# Patient Record
Sex: MALE | URBAN - METROPOLITAN AREA
[De-identification: names, ages, dates, MRNs, and addresses within clinical notes are randomized per-mention and may not be internally consistent; named-entity substitution may affect disease eponyms.]

---

## 2024-01-24 ENCOUNTER — HOSPITAL ENCOUNTER (OUTPATIENT)
Facility: HOSPITAL | Age: 39
Discharge: HOME | DRG: 690 | End: 2024-01-25
Attending: INTERNAL MEDICINE | Admitting: INTERNAL MEDICINE
Payer: MEDICARE

## 2024-01-24 ENCOUNTER — APPOINTMENT (OUTPATIENT)
Dept: RADIOLOGY | Facility: HOSPITAL | Age: 39
DRG: 690 | End: 2024-01-24
Payer: MEDICARE

## 2024-01-24 DIAGNOSIS — N20.0 KIDNEY STONE: Primary | ICD-10-CM

## 2024-01-24 DIAGNOSIS — N39.0 COMPLICATED UTI (URINARY TRACT INFECTION): ICD-10-CM

## 2024-01-24 LAB
ANION GAP SERPL CALC-SCNC: 17 MMOL/L (ref 10–20)
BUN SERPL-MCNC: 14 MG/DL (ref 6–23)
CALCIUM SERPL-MCNC: 7.7 MG/DL (ref 8.6–10.3)
CHLORIDE SERPL-SCNC: 101 MMOL/L (ref 98–107)
CO2 SERPL-SCNC: 22 MMOL/L (ref 21–32)
CREAT SERPL-MCNC: 1.06 MG/DL (ref 0.5–1.3)
EGFRCR SERPLBLD CKD-EPI 2021: >90 ML/MIN/1.73M*2
ERYTHROCYTE [DISTWIDTH] IN BLOOD BY AUTOMATED COUNT: 14.6 % (ref 11.5–14.5)
GLUCOSE SERPL-MCNC: 88 MG/DL (ref 74–99)
HCT VFR BLD AUTO: 37.6 % (ref 41–52)
HGB BLD-MCNC: 12.3 G/DL (ref 13.5–17.5)
MCH RBC QN AUTO: 29.3 PG (ref 26–34)
MCHC RBC AUTO-ENTMCNC: 32.7 G/DL (ref 32–36)
MCV RBC AUTO: 90 FL (ref 80–100)
NRBC BLD-RTO: 0 /100 WBCS (ref 0–0)
PLATELET # BLD AUTO: 884 X10*3/UL (ref 150–450)
POTASSIUM SERPL-SCNC: 4.9 MMOL/L (ref 3.5–5.3)
RBC # BLD AUTO: 4.2 X10*6/UL (ref 4.5–5.9)
SODIUM SERPL-SCNC: 135 MMOL/L (ref 136–145)
WBC # BLD AUTO: 18.6 X10*3/UL (ref 4.4–11.3)

## 2024-01-24 PROCEDURE — 36415 COLL VENOUS BLD VENIPUNCTURE: CPT | Performed by: INTERNAL MEDICINE

## 2024-01-24 PROCEDURE — 99152 MOD SED SAME PHYS/QHP 5/>YRS: CPT | Performed by: RADIOLOGY

## 2024-01-24 PROCEDURE — C1894 INTRO/SHEATH, NON-LASER: HCPCS

## 2024-01-24 PROCEDURE — C1729 CATH, DRAINAGE: HCPCS

## 2024-01-24 PROCEDURE — 99152 MOD SED SAME PHYS/QHP 5/>YRS: CPT

## 2024-01-24 PROCEDURE — 2720000007 HC OR 272 NO HCPCS

## 2024-01-24 PROCEDURE — 2500000002 HC RX 250 W HCPCS SELF ADMINISTERED DRUGS (ALT 637 FOR MEDICARE OP, ALT 636 FOR OP/ED): Mod: MUE | Performed by: INTERNAL MEDICINE

## 2024-01-24 PROCEDURE — 2500000004 HC RX 250 GENERAL PHARMACY W/ HCPCS (ALT 636 FOR OP/ED): Performed by: INTERNAL MEDICINE

## 2024-01-24 PROCEDURE — 50432 PLMT NEPHROSTOMY CATHETER: CPT | Performed by: RADIOLOGY

## 2024-01-24 PROCEDURE — 85027 COMPLETE CBC AUTOMATED: CPT | Performed by: INTERNAL MEDICINE

## 2024-01-24 PROCEDURE — 93010 ELECTROCARDIOGRAM REPORT: CPT | Performed by: INTERNAL MEDICINE

## 2024-01-24 PROCEDURE — 1100000001 HC PRIVATE ROOM DAILY

## 2024-01-24 PROCEDURE — 50431 NJX PX NFROSGRM &/URTRGRM: CPT

## 2024-01-24 PROCEDURE — 2500000001 HC RX 250 WO HCPCS SELF ADMINISTERED DRUGS (ALT 637 FOR MEDICARE OP): Performed by: INTERNAL MEDICINE

## 2024-01-24 PROCEDURE — 0T9330Z DRAINAGE OF RIGHT KIDNEY PELVIS WITH DRAINAGE DEVICE, PERCUTANEOUS APPROACH: ICD-10-PCS | Performed by: RADIOLOGY

## 2024-01-24 PROCEDURE — 2500000005 HC RX 250 GENERAL PHARMACY W/O HCPCS: Performed by: RADIOLOGY

## 2024-01-24 PROCEDURE — 76942 ECHO GUIDE FOR BIOPSY: CPT

## 2024-01-24 PROCEDURE — 80048 BASIC METABOLIC PNL TOTAL CA: CPT | Performed by: INTERNAL MEDICINE

## 2024-01-24 PROCEDURE — 99223 1ST HOSP IP/OBS HIGH 75: CPT | Performed by: INTERNAL MEDICINE

## 2024-01-24 PROCEDURE — 50432 PLMT NEPHROSTOMY CATHETER: CPT

## 2024-01-24 PROCEDURE — 99232 SBSQ HOSP IP/OBS MODERATE 35: CPT | Performed by: STUDENT IN AN ORGANIZED HEALTH CARE EDUCATION/TRAINING PROGRAM

## 2024-01-24 PROCEDURE — 2500000004 HC RX 250 GENERAL PHARMACY W/ HCPCS (ALT 636 FOR OP/ED): Performed by: RADIOLOGY

## 2024-01-24 RX ORDER — DOXEPIN HYDROCHLORIDE 25 MG/1
25 CAPSULE ORAL NIGHTLY
COMMUNITY

## 2024-01-24 RX ORDER — MORPHINE SULFATE 2 MG/ML
1 INJECTION, SOLUTION INTRAMUSCULAR; INTRAVENOUS EVERY 4 HOURS PRN
Status: DISCONTINUED | OUTPATIENT
Start: 2024-01-24 | End: 2024-01-25 | Stop reason: HOSPADM

## 2024-01-24 RX ORDER — CHOLECALCIFEROL (VITAMIN D3) 50 MCG
50 TABLET ORAL DAILY
COMMUNITY

## 2024-01-24 RX ORDER — DOXEPIN HYDROCHLORIDE 25 MG/1
25 CAPSULE ORAL NIGHTLY
Status: DISCONTINUED | OUTPATIENT
Start: 2024-01-24 | End: 2024-01-25 | Stop reason: HOSPADM

## 2024-01-24 RX ORDER — FENTANYL CITRATE 50 UG/ML
INJECTION, SOLUTION INTRAMUSCULAR; INTRAVENOUS
Status: COMPLETED | OUTPATIENT
Start: 2024-01-24 | End: 2024-01-24

## 2024-01-24 RX ORDER — QUETIAPINE FUMARATE 100 MG/1
200 TABLET, FILM COATED ORAL NIGHTLY
Status: DISCONTINUED | OUTPATIENT
Start: 2024-01-24 | End: 2024-01-25 | Stop reason: HOSPADM

## 2024-01-24 RX ORDER — ACETAMINOPHEN 325 MG/1
650 TABLET ORAL EVERY 6 HOURS PRN
Status: DISCONTINUED | OUTPATIENT
Start: 2024-01-24 | End: 2024-01-25 | Stop reason: HOSPADM

## 2024-01-24 RX ORDER — LAMOTRIGINE 100 MG/1
50 TABLET ORAL DAILY
Status: DISCONTINUED | OUTPATIENT
Start: 2024-01-24 | End: 2024-01-25 | Stop reason: HOSPADM

## 2024-01-24 RX ORDER — SODIUM CHLORIDE 9 MG/ML
INJECTION, SOLUTION INTRAVENOUS CONTINUOUS PRN
Status: COMPLETED | OUTPATIENT
Start: 2024-01-24 | End: 2024-01-24

## 2024-01-24 RX ORDER — QUETIAPINE FUMARATE 200 MG/1
200 TABLET, FILM COATED ORAL NIGHTLY
COMMUNITY

## 2024-01-24 RX ORDER — MIDAZOLAM HYDROCHLORIDE 1 MG/ML
INJECTION INTRAMUSCULAR; INTRAVENOUS
Status: COMPLETED | OUTPATIENT
Start: 2024-01-24 | End: 2024-01-24

## 2024-01-24 RX ORDER — ONDANSETRON HYDROCHLORIDE 2 MG/ML
4 INJECTION, SOLUTION INTRAVENOUS EVERY 6 HOURS PRN
Status: DISCONTINUED | OUTPATIENT
Start: 2024-01-24 | End: 2024-01-25 | Stop reason: HOSPADM

## 2024-01-24 RX ORDER — CEFTRIAXONE 1 G/50ML
1 INJECTION, SOLUTION INTRAVENOUS EVERY 24 HOURS
Status: DISCONTINUED | OUTPATIENT
Start: 2024-01-24 | End: 2024-01-25 | Stop reason: HOSPADM

## 2024-01-24 RX ORDER — LOPERAMIDE HYDROCHLORIDE 2 MG/1
2 CAPSULE ORAL 2 TIMES DAILY PRN
Status: DISCONTINUED | OUTPATIENT
Start: 2024-01-24 | End: 2024-01-25 | Stop reason: HOSPADM

## 2024-01-24 RX ORDER — LAMOTRIGINE 25 MG/1
50 TABLET ORAL DAILY
COMMUNITY

## 2024-01-24 RX ORDER — SODIUM CHLORIDE 9 MG/ML
100 INJECTION, SOLUTION INTRAVENOUS CONTINUOUS
Status: DISCONTINUED | OUTPATIENT
Start: 2024-01-24 | End: 2024-01-25

## 2024-01-24 RX ORDER — MORPHINE SULFATE 2 MG/ML
2 INJECTION, SOLUTION INTRAMUSCULAR; INTRAVENOUS EVERY 4 HOURS PRN
Status: DISCONTINUED | OUTPATIENT
Start: 2024-01-24 | End: 2024-01-25 | Stop reason: HOSPADM

## 2024-01-24 RX ADMIN — ACETAMINOPHEN 650 MG: 325 TABLET ORAL at 06:09

## 2024-01-24 RX ADMIN — LOPERAMIDE HYDROCHLORIDE 2 MG: 2 CAPSULE ORAL at 06:11

## 2024-01-24 RX ADMIN — QUETIAPINE FUMARATE 200 MG: 100 TABLET ORAL at 20:00

## 2024-01-24 RX ADMIN — ONDANSETRON 4 MG: 2 INJECTION INTRAMUSCULAR; INTRAVENOUS at 19:59

## 2024-01-24 RX ADMIN — FENTANYL CITRATE 50 MCG: 50 INJECTION, SOLUTION INTRAMUSCULAR; INTRAVENOUS at 13:48

## 2024-01-24 RX ADMIN — SODIUM CHLORIDE 100 ML/HR: 9 INJECTION, SOLUTION INTRAVENOUS at 05:14

## 2024-01-24 RX ADMIN — MORPHINE SULFATE 2 MG: 2 INJECTION, SOLUTION INTRAMUSCULAR; INTRAVENOUS at 16:18

## 2024-01-24 RX ADMIN — SODIUM CHLORIDE 50 ML/HR: 9 INJECTION, SOLUTION INTRAVENOUS at 13:33

## 2024-01-24 RX ADMIN — LAMOTRIGINE 50 MG: 100 TABLET ORAL at 08:05

## 2024-01-24 RX ADMIN — CEFTRIAXONE SODIUM 1 G: 1 INJECTION, SOLUTION INTRAVENOUS at 17:07

## 2024-01-24 RX ADMIN — Medication 3 L/MIN: at 13:35

## 2024-01-24 RX ADMIN — MORPHINE SULFATE 2 MG: 2 INJECTION, SOLUTION INTRAMUSCULAR; INTRAVENOUS at 06:27

## 2024-01-24 RX ADMIN — DOXEPIN HYDROCHLORIDE 25 MG: 25 CAPSULE ORAL at 20:00

## 2024-01-24 RX ADMIN — MIDAZOLAM HYDROCHLORIDE 1 MG: 1 INJECTION, SOLUTION INTRAMUSCULAR; INTRAVENOUS at 13:48

## 2024-01-24 RX ADMIN — QUETIAPINE FUMARATE 200 MG: 100 TABLET ORAL at 02:48

## 2024-01-24 SDOH — SOCIAL STABILITY: SOCIAL INSECURITY: HAS ANYONE EVER THREATENED TO HURT YOUR FAMILY OR YOUR PETS?: NO

## 2024-01-24 SDOH — SOCIAL STABILITY: SOCIAL INSECURITY: ARE YOU OR HAVE YOU BEEN THREATENED OR ABUSED PHYSICALLY, EMOTIONALLY, OR SEXUALLY BY ANYONE?: NO

## 2024-01-24 SDOH — SOCIAL STABILITY: SOCIAL INSECURITY: DOES ANYONE TRY TO KEEP YOU FROM HAVING/CONTACTING OTHER FRIENDS OR DOING THINGS OUTSIDE YOUR HOME?: NO

## 2024-01-24 SDOH — SOCIAL STABILITY: SOCIAL INSECURITY: DO YOU FEEL UNSAFE GOING BACK TO THE PLACE WHERE YOU ARE LIVING?: NO

## 2024-01-24 SDOH — SOCIAL STABILITY: SOCIAL INSECURITY: HAVE YOU HAD THOUGHTS OF HARMING ANYONE ELSE?: NO

## 2024-01-24 SDOH — SOCIAL STABILITY: SOCIAL INSECURITY: ARE THERE ANY APPARENT SIGNS OF INJURIES/BEHAVIORS THAT COULD BE RELATED TO ABUSE/NEGLECT?: NO

## 2024-01-24 SDOH — SOCIAL STABILITY: SOCIAL INSECURITY: WERE YOU ABLE TO COMPLETE ALL THE BEHAVIORAL HEALTH SCREENINGS?: YES

## 2024-01-24 SDOH — SOCIAL STABILITY: SOCIAL INSECURITY: DO YOU FEEL ANYONE HAS EXPLOITED OR TAKEN ADVANTAGE OF YOU FINANCIALLY OR OF YOUR PERSONAL PROPERTY?: NO

## 2024-01-24 SDOH — SOCIAL STABILITY: SOCIAL INSECURITY: ABUSE: ADULT

## 2024-01-24 ASSESSMENT — LIFESTYLE VARIABLES
HOW OFTEN DURING THE LAST YEAR HAVE YOU BEEN UNABLE TO REMEMBER WHAT HAPPENED THE NIGHT BEFORE BECAUSE YOU HAD BEEN DRINKING: NEVER
SUBSTANCE_ABUSE_PAST_12_MONTHS: NO
SKIP TO QUESTIONS 9-10: 0
HOW OFTEN DURING THE LAST YEAR HAVE YOU NEEDED AN ALCOHOLIC DRINK FIRST THING IN THE MORNING TO GET YOURSELF GOING AFTER A NIGHT OF HEAVY DRINKING: NEVER
PRESCIPTION_ABUSE_PAST_12_MONTHS: NO
HOW OFTEN DO YOU HAVE 6 OR MORE DRINKS ON ONE OCCASION: LESS THAN MONTHLY
AUDIT-C TOTAL SCORE: 3
HOW OFTEN DO YOU HAVE A DRINK CONTAINING ALCOHOL: 2-4 TIMES A MONTH
AUDIT-C TOTAL SCORE: 3
HOW OFTEN DURING THE LAST YEAR HAVE YOU FOUND THAT YOU WERE NOT ABLE TO STOP DRINKING ONCE YOU HAD STARTED: NEVER
HOW OFTEN DURING THE LAST YEAR HAVE YOU FAILED TO DO WHAT WAS NORMALLY EXPECTED FROM YOU BECAUSE OF DRINKING: NEVER
HOW MANY STANDARD DRINKS CONTAINING ALCOHOL DO YOU HAVE ON A TYPICAL DAY: 1 OR 2
AUDIT TOTAL SCORE: 0
HAVE YOU OR SOMEONE ELSE BEEN INJURED AS A RESULT OF YOUR DRINKING: NO
HAS A RELATIVE, FRIEND, DOCTOR, OR ANOTHER HEALTH PROFESSIONAL EXPRESSED CONCERN ABOUT YOUR DRINKING OR SUGGESTED YOU CUT DOWN: NO
HOW OFTEN DURING THE LAST YEAR HAVE YOU HAD A FEELING OF GUILT OR REMORSE AFTER DRINKING: NEVER

## 2024-01-24 ASSESSMENT — PAIN - FUNCTIONAL ASSESSMENT
PAIN_FUNCTIONAL_ASSESSMENT: 0-10

## 2024-01-24 ASSESSMENT — ACTIVITIES OF DAILY LIVING (ADL)
ADEQUATE_TO_COMPLETE_ADL: YES
HEARING - RIGHT EAR: FUNCTIONAL
HEARING - LEFT EAR: FUNCTIONAL
JUDGMENT_ADEQUATE_SAFELY_COMPLETE_DAILY_ACTIVITIES: YES
BATHING: INDEPENDENT
TOILETING: INDEPENDENT
LACK_OF_TRANSPORTATION: NO
GROOMING: INDEPENDENT
WALKS IN HOME: INDEPENDENT
FEEDING YOURSELF: INDEPENDENT
PATIENT'S MEMORY ADEQUATE TO SAFELY COMPLETE DAILY ACTIVITIES?: YES
DRESSING YOURSELF: INDEPENDENT

## 2024-01-24 ASSESSMENT — COGNITIVE AND FUNCTIONAL STATUS - GENERAL
DAILY ACTIVITIY SCORE: 24
MOBILITY SCORE: 24
PATIENT BASELINE BEDBOUND: NO
MOBILITY SCORE: 24
DAILY ACTIVITIY SCORE: 24

## 2024-01-24 ASSESSMENT — PAIN DESCRIPTION - ORIENTATION
ORIENTATION: RIGHT

## 2024-01-24 ASSESSMENT — PAIN SCALES - GENERAL
PAINLEVEL_OUTOF10: 7
PAINLEVEL_OUTOF10: 5 - MODERATE PAIN
PAINLEVEL_OUTOF10: 0 - NO PAIN
PAINLEVEL_OUTOF10: 7
PAINLEVEL_OUTOF10: 0 - NO PAIN
PAINLEVEL_OUTOF10: 0 - NO PAIN
PAINLEVEL_OUTOF10: 2
PAINLEVEL_OUTOF10: 7
PAINLEVEL_OUTOF10: 0 - NO PAIN

## 2024-01-24 ASSESSMENT — PATIENT HEALTH QUESTIONNAIRE - PHQ9
1. LITTLE INTEREST OR PLEASURE IN DOING THINGS: NOT AT ALL
2. FEELING DOWN, DEPRESSED OR HOPELESS: NOT AT ALL

## 2024-01-24 ASSESSMENT — ENCOUNTER SYMPTOMS
FLANK PAIN: 1
VOMITING: 0
ABDOMINAL PAIN: 0
CHILLS: 0
NAUSEA: 0
FEVER: 0
HEMATURIA: 1

## 2024-01-24 ASSESSMENT — COLUMBIA-SUICIDE SEVERITY RATING SCALE - C-SSRS
2. HAVE YOU ACTUALLY HAD ANY THOUGHTS OF KILLING YOURSELF?: NO
6. HAVE YOU EVER DONE ANYTHING, STARTED TO DO ANYTHING, OR PREPARED TO DO ANYTHING TO END YOUR LIFE?: NO
1. IN THE PAST MONTH, HAVE YOU WISHED YOU WERE DEAD OR WISHED YOU COULD GO TO SLEEP AND NOT WAKE UP?: NO

## 2024-01-24 ASSESSMENT — PAIN DESCRIPTION - LOCATION
LOCATION: BACK
LOCATION: ABDOMEN
LOCATION: BACK

## 2024-01-24 NOTE — POST-PROCEDURE NOTE
Interventional Radiology Brief Postprocedure Note    Attending: Ashutosh Marin MD     Assistant: none    Diagnosis: obstructing right ureteral stone and hydro    Description of procedure: Successful placement of a 10F right nephrostomy tube, which was connected to gravity drain. About 200 ml purulent material was aspirated.     Anesthesia:  Local    Complications: None    Estimated Blood Loss: minimal    Medications  As of 01/24/24 1559      doxepin (SINEquan) capsule 25 mg (mg) Total dose:  0 mg* Dosing weight:  58.8   *Administration not included in total     Date/Time Rate/Dose/Volume Action       01/24/24  0230 *25 mg Missed               QUEtiapine (SEROquel) tablet 200 mg (mg) Total dose:  200 mg Dosing weight:  58.8      Date/Time Rate/Dose/Volume Action       01/24/24  0248 200 mg Given               lamoTRIgine (LaMICtal) tablet 50 mg (mg) Total dose:  50 mg Dosing weight:  58.8      Date/Time Rate/Dose/Volume Action       01/24/24  0805 50 mg Given               sodium chloride 0.9% infusion (mL/hr) Total volume:  Not documented* Dosing weight:  58.8   *Total volume has not been documented. View each administration to see the amount administered.     Date/Time Rate/Dose/Volume Action       01/24/24  0514 100 mL/hr New Bag               sodium chloride 0.9% infusion (mL/hr) Total volume:  Not documented*   *Total volume has not been documented. View each administration to see the amount administered.     Date/Time Rate/Dose/Volume Action       01/24/24  1333 50 mL/hr New Bag               acetaminophen (Tylenol) tablet 650 mg (mg) Total dose:  650 mg Dosing weight:  58.8      Date/Time Rate/Dose/Volume Action       01/24/24  0609 650 mg Given               cefTRIAXone (Rocephin) IVPB 1 g (g) Total dose:  0 g* Dosing weight:  58.8   *Administration not included in total     Date/Time Rate/Dose/Volume Action       01/24/24  0230 *1 g - 100 mL/hr (over 30 min) Missed               loperamide (Imodium) capsule  2 mg (mg) Total dose:  2 mg Dosing weight:  58.8      Date/Time Rate/Dose/Volume Action       01/24/24  0611 2 mg Given               morphine injection 2 mg (mg) Total dose:  2 mg Dosing weight:  58.8      Date/Time Rate/Dose/Volume Action       01/24/24  0627 2 mg Given               fentaNYL PF (Sublimaze) injection (mcg) Total dose:  50 mcg      Date/Time Rate/Dose/Volume Action       01/24/24  1348 50 mcg Given               midazolam (Versed) injection (mg) Total dose:  1 mg      Date/Time Rate/Dose/Volume Action       01/24/24  1348 1 mg Given               oxygen (O2) therapy (L/min) Total volume:  Not documented* Dosing weight:  58.8   *Total volume has not been documented. View each administration to see the amount administered.     Date/Time Rate/Dose/Volume Action       01/24/24  1335 3 L/min Start                   No specimens collected      See detailed result report with images in PACS.    The patient tolerated the procedure well without incident or complication and is in stable condition.

## 2024-01-24 NOTE — CONSULTS
Assessment/Plan     Inpatient consult to Urology  Consult performed by: Jessie Cobian PA-C  Consult ordered by: Feliciano Austin MD  Reason for consult: Obstructing ureteral calculus        Subjective     Silver Portillo is a 38 y.o. male with a PMH of spina bifida, neurogenic bladder, bladder stone who presents from an outside hospital with complaints of flank pain.  Patient experienced 5 days of flank pain.  He attributed to constipation but when it did not get better he presents to the emergency department tonight for evaluation.  A CT scan of the abdomen pelvis showed a 1.8 cm distal right ureteral calculus with severe hydroureteronephrosis.  Urinalysis consistent with saundra urinary tract infection.  Patient did have a leukocytosis.  Renal function stable.  Patient denies n/v, fever, chills.  Patient denies a history of kidney stones but did lithotripsy for a bladder stone in the past.  He has a neurogenic bladder and typically does intermittent self catheterization.  Currently has a Rothman catheter indwelling with yellow cloudy urine.  Started on IV Rocephin 1 gram daily.  A local urologist was consulted at previous facility, they advised the patient be transferred for a percutaneous nephrostomy tube as the stone was too large to stent around.  We are the closest facility able to accept the patient with IR capabilities.     Review of Systems  Review of Systems   Constitutional:  Negative for chills and fever.   Gastrointestinal:  Negative for abdominal pain, nausea and vomiting.   Genitourinary:  Positive for flank pain and hematuria.     Objective     Vital signs for last 24 hours:  Temp:  [36.6 °C (97.9 °F)-37.1 °C (98.8 °F)] 37.1 °C (98.8 °F)  Heart Rate:  [105-123] 105  Resp:  [18] 18  BP: (131-139)/(75-77) 131/77    Intake/Output this shift:  I/O this shift:  In: -   Out: 350 [Urine:350]    Physical Exam  Physical Exam  Vitals and nursing note reviewed.   Constitutional:       General: He is awake. He  is not in acute distress.     Appearance: He is not ill-appearing.   Pulmonary:      Effort: Pulmonary effort is normal.   Abdominal:      General: Abdomen is flat.      Palpations: Abdomen is soft.      Tenderness: There is no abdominal tenderness.   Genitourinary:     Comments: Rothman catheter to CD with urine yellow cloudy.  Neurological:      Mental Status: He is alert and oriented to person, place, and time.   Psychiatric:         Behavior: Behavior is cooperative.       Labs  Scheduled medications  cefTRIAXone, 1 g, intravenous, q24h  doxepin, 25 mg, oral, Nightly  lamoTRIgine, 50 mg, oral, Daily  QUEtiapine, 200 mg, oral, Nightly      Continuous medications  sodium chloride 0.9%, 100 mL/hr, Last Rate: 100 mL/hr (01/24/24 0514)      PRN medications  PRN medications: acetaminophen, loperamide, morphine, morphine    Results for orders placed or performed during the hospital encounter of 01/24/24 (from the past 24 hour(s))   CBC   Result Value Ref Range    WBC 18.6 (H) 4.4 - 11.3 x10*3/uL    nRBC 0.0 0.0 - 0.0 /100 WBCs    RBC 4.20 (L) 4.50 - 5.90 x10*6/uL    Hemoglobin 12.3 (L) 13.5 - 17.5 g/dL    Hematocrit 37.6 (L) 41.0 - 52.0 %    MCV 90 80 - 100 fL    MCH 29.3 26.0 - 34.0 pg    MCHC 32.7 32.0 - 36.0 g/dL    RDW 14.6 (H) 11.5 - 14.5 %    Platelets 884 (H) 150 - 450 x10*3/uL   Basic metabolic panel   Result Value Ref Range    Glucose 88 74 - 99 mg/dL    Sodium 135 (L) 136 - 145 mmol/L    Potassium 4.9 3.5 - 5.3 mmol/L    Chloride 101 98 - 107 mmol/L    Bicarbonate 22 21 - 32 mmol/L    Anion Gap 17 10 - 20 mmol/L    Urea Nitrogen 14 6 - 23 mg/dL    Creatinine 1.06 0.50 - 1.30 mg/dL    eGFR >90 >60 mL/min/1.73m*2    Calcium 7.7 (L) 8.6 - 10.3 mg/dL     Assessment/Plan    1. 1.8 cm right distal ureteral stone with UTI  -Positive leukocytosis, renal function stable.  -UA positive for infection.  Await cultures from outside facility.  -Continue broad spectrum abx.  -Maintain Rothman catheter until infection  tapan.  -Discussed with my attending, Dr. Bowser, who recommends order to IR for right PCNT placement.  -Stat consult placed to IR.  -Patient and family agreeable.  Keep NPO.  -Definitive stone treatment after full course of abx.    Thank you for allowing  Urology to participate in this patient's care.

## 2024-01-24 NOTE — CARE PLAN
The patient's goals for the shift include      The clinical goals for the shift include comfort      Problem: Pain  Goal: My pain/discomfort is manageable  Outcome: Progressing     Problem: Daily Care  Goal: Daily care needs are met  Outcome: Progressing     Problem: Psychosocial Needs  Goal: Demonstrates ability to cope with hospitalization/illness  Outcome: Progressing  Goal: Collaborate with me, my family, and caregiver to identify my specific goals  Outcome: Progressing

## 2024-01-24 NOTE — PROGRESS NOTES
01/24/24 1605   Discharge Planning   Living Arrangements Alone   Support Systems Children;Parent;Family members;Friends/neighbors   Assistance Needed independent   Type of Residence Private residence   Number of Stairs to Enter Residence 0   Number of Stairs Within Residence 0   Patient expects to be discharged to: home   Does the patient need discharge transport arranged? No     I met with patient at the bedside, verified insurance and demographics.  Patient lives alone, has his young son on weekends.  He does not use mobility aids, denies falls, drives and is independent with ADLs.  Patient declined home health care, stated no home going needs at this time.  Care Coordination team following for assistance with discharge planning as needed.  Filomena MUJICA TCC

## 2024-01-24 NOTE — PROGRESS NOTES
Subjective   Seen this AM sitting up in bed. Accompanied by father at bedside. He had been having right flank pain, which is now 0/10. Denies any acute complaints.    Objective   Vitals:    01/24/24 1600   BP: 109/57   Pulse: (!) 113   Resp: 20   Temp: 36.8 °C (98.2 °F)   SpO2: 93%       Physical Exam:   Constitutional: well developed, awake, alert, no acute distress  ENMT: mucous membranes moist, conjunctivae clear  Head/Neck: normocephalic, atraumatic; supple, trachea midline  Respiratory/Thorax: patent airways, CTAB; no wheezes, rales, or rhonchi  Cardiovascular: RRR, no murmur appreciated  Gastrointestinal: soft, nondistended, non-tender, bowel sounds appreciated  Back: negative King's punch, negative CVA tenderness  Extremities: palpable peripheral pulses, no edema  Neurological: AO x3, no focal deficits  Psychological: pleasant, cooperative  Skin: warm and dry    Scheduled Medications:   cefTRIAXone, 1 g, intravenous, q24h  doxepin, 25 mg, oral, Nightly  iohexol, 15 mL, miscellaneous, Once in imaging  lamoTRIgine, 50 mg, oral, Daily  QUEtiapine, 200 mg, oral, Nightly    Continuous Medications:   sodium chloride 0.9%, 100 mL/hr, Last Rate: 100 mL/hr (01/24/24 1809)    PRN Medications:   PRN medications: acetaminophen, loperamide, morphine, morphine, oxygen    Assessment/Plan   This is a 38-year-old male, with history of spina bifida, neurogenic bladder requiring intermittent straight cath, bipolar disorder, and prior nephrolithiasis, who initially presented to [facility] ED with right flank pain and constipation x5 days. He was found to have 1.8 x 1.2 cm stone within distal right ureter accompanied by severe hydroureteronephrosis. Patient was transferred from [facility] ED to [facility] on 1/24/24 in anticipation for PCNT placement via IR.    Complicated UTI in setting of large right obstructing nephrolithiasis with hydroureteronephrosis  Thrombocytosis, suspect reactive    #Spina bifida  #Neurogenic bladder  requiring intermittent straight cath  #Bipolar disorder I  #Hx nephrolithiasis    Continue Rocephin (Day 2) - this was started at OSH. Urology consulted, who recommended IR for right PCNT placement. Maintain Rothman. Will follow up on UCx and BCx obtained at OSH.    DVT PPX: Ambulation (low-risk)  Diet: NPO  IVF: NS @ 100cc/hr  Consults: Urology, IR  Code status: FULL    This is a preliminary note written by the resident. Please wait for attending addendum for finalization of note and recommendations.

## 2024-01-24 NOTE — H&P
"[facility] Medicine History & Physical    Patient Name: Silver Portillo   YOB: 1985    Subjective:    Silver Portillo is a 38 y.o. male who presents from an outside hospital with complaints of flank pain.  Patient experienced 5 days of flank pain.  He attributed to constipation but when it did not get better he presents to the emergency department tonight for evaluation.  A CT scan of the abdomen pelvis showed a 1.8 cm obstructing stone with hydroureteronephrosis.  Urinalysis consistent with saundra urinary tract infection.  Patient did have a leukocytosis.  Patient denies fevers or chills.  Since arrival to this facility has developed diarrhea.  Does have a history of kidney stones.  When the emergency department spoke to local urologist they advised the patient will likely need a percutaneous nephrostomy tube as the stone was too large to stent around.  We are the closest facility able to accept the patient with IR capabilities.    A 10 point ROS was completed and is negative expect as stated in HPI.     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 Alert and cooperative.  HEENT: Normocephalic and atraumatic.  Mucous membranes moist.  Clear sclera.  CARDIOVASCULAR: Regular rate and rhythm.  No murmurs, rubs, or gallops. S1/S2.  RESPIRATORY: Clear to auscultation b/l. No wheezes, rales or rhonchi. Normal effort.   ABDOMEN: Soft, non-tender. Not distended. No rebound or " guarding.  EXTREMITIES: No peripheral edema.  NEUROLOGICAL: A&O X 3. CN II-XII are grossly intact. No focal deficits.   SKIN: Warm and dry, no lesions, no rashes.  PSYCH: Appropriate mood and affect.      Assessment/Plan:    Large obstructing nephrolithiasis  Hydroureteronephrosis  Urinary tract infection  Neurogenic bladder requiring intermittent straight cath  Bipolar 1  Spina bifida  Previous nephrolithiasis  Incidental finding of perihepatic fluid collection    Patient will be started on IV Rocephin 1 g daily. Urine and blood cultures were drawn at the referring facility and hopefully the results will show up with care everywhere on [facility], may have to contact original facility.  Patient will be made n.p.o. in anticipation of procedure tomorrow.  Await urology opinion regarding JJ stent versus percutaneous nephrostomy before consulting IR.  Given ongoing urological issues will place Rothman catheter instead of using intermittent straight cath.  Keep an eye on renal function.  IV fluids.  Resume home medications  CT scan showed a perihepatic fluid collection which should be followed up by his usual providers, likely related to his shunt according to radiologist.      Large file of paperwork with labs and imaging reports were sent over and available in the patient's binder.  I did asked nursing to stand the imaging disc to radiology for upload in case urology want to review the images.        DVT Prophylaxis: pharm dvt proph not indicated per hospital protocol   Code Status: Full code  Disposition: To be determined      Rodolfo Burch, DO  Hospital Medicine

## 2024-01-24 NOTE — PRE-PROCEDURE NOTE
Interventional Radiology Preprocedure Note    Indication for procedure: The encounter diagnosis was Kidney stone.    Relevant review of systems:  no fevers or chills    Relevant Labs:   Lab Results   Component Value Date    CREATININE 1.06 01/24/2024    EGFR >90 01/24/2024       Planned Sedation/Anesthesia: Moderate    Airway assessment: normal    Directed physical examination:    Resp: normal RR  CV: tachycardia, regular rhythm    Mallampati: I (soft palate, uvula, fauces, and tonsillar pillars visible)    ASA Score: ASA 2 - Patient with mild systemic disease with no functional limitations    Benefits, risks and alternatives of procedure and planned sedation have been discussed with the patient and/or their representative. All questions answered and they agree to proceed.

## 2024-01-25 ENCOUNTER — APPOINTMENT (OUTPATIENT)
Dept: CARDIOLOGY | Facility: HOSPITAL | Age: 39
DRG: 690 | End: 2024-01-25
Payer: MEDICARE

## 2024-01-25 VITALS
DIASTOLIC BLOOD PRESSURE: 84 MMHG | HEIGHT: 65 IN | RESPIRATION RATE: 20 BRPM | TEMPERATURE: 97.2 F | WEIGHT: 129.6 LBS | HEART RATE: 90 BPM | BODY MASS INDEX: 21.59 KG/M2 | OXYGEN SATURATION: 94 % | SYSTOLIC BLOOD PRESSURE: 136 MMHG

## 2024-01-25 PROCEDURE — 2500000004 HC RX 250 GENERAL PHARMACY W/ HCPCS (ALT 636 FOR OP/ED): Performed by: INTERNAL MEDICINE

## 2024-01-25 PROCEDURE — 2500000001 HC RX 250 WO HCPCS SELF ADMINISTERED DRUGS (ALT 637 FOR MEDICARE OP): Performed by: INTERNAL MEDICINE

## 2024-01-25 PROCEDURE — 99239 HOSP IP/OBS DSCHRG MGMT >30: CPT | Performed by: STUDENT IN AN ORGANIZED HEALTH CARE EDUCATION/TRAINING PROGRAM

## 2024-01-25 PROCEDURE — 93005 ELECTROCARDIOGRAM TRACING: CPT

## 2024-01-25 RX ORDER — CIPROFLOXACIN 500 MG/1
500 TABLET ORAL 2 TIMES DAILY
Qty: 10 TABLET | Refills: 0 | Status: SHIPPED | OUTPATIENT
Start: 2024-01-25 | End: 2024-01-30

## 2024-01-25 RX ADMIN — ONDANSETRON 4 MG: 2 INJECTION INTRAMUSCULAR; INTRAVENOUS at 10:18

## 2024-01-25 RX ADMIN — SODIUM CHLORIDE 100 ML/HR: 9 INJECTION, SOLUTION INTRAVENOUS at 01:53

## 2024-01-25 RX ADMIN — MORPHINE SULFATE 2 MG: 2 INJECTION, SOLUTION INTRAMUSCULAR; INTRAVENOUS at 01:53

## 2024-01-25 RX ADMIN — LAMOTRIGINE 50 MG: 100 TABLET ORAL at 09:09

## 2024-01-25 RX ADMIN — CEFTRIAXONE SODIUM 1 G: 1 INJECTION, SOLUTION INTRAVENOUS at 15:38

## 2024-01-25 ASSESSMENT — COGNITIVE AND FUNCTIONAL STATUS - GENERAL
DAILY ACTIVITIY SCORE: 24
MOBILITY SCORE: 24
DAILY ACTIVITIY SCORE: 24
MOBILITY SCORE: 23
CLIMB 3 TO 5 STEPS WITH RAILING: A LITTLE

## 2024-01-25 ASSESSMENT — PAIN DESCRIPTION - ORIENTATION: ORIENTATION: RIGHT;LEFT

## 2024-01-25 ASSESSMENT — PAIN DESCRIPTION - LOCATION: LOCATION: BACK

## 2024-01-25 ASSESSMENT — PAIN SCALES - GENERAL
PAINLEVEL_OUTOF10: 5 - MODERATE PAIN
PAINLEVEL_OUTOF10: 10 - WORST POSSIBLE PAIN

## 2024-01-25 ASSESSMENT — PAIN - FUNCTIONAL ASSESSMENT: PAIN_FUNCTIONAL_ASSESSMENT: 0-10

## 2024-01-25 NOTE — PROGRESS NOTES
PROGRESS NOTE  Patient Name: Silver Portillo   YOB: 1985    Subjective  Follow-up 1.8 cm distal ureteral stone s/p right PCNT by IR.  Per procedure note, 200mls of purulent fluid material aspirated.  Patient doing well today.  States stone pain has resolved.  He is experiencing some diarrhea.  Denies fever/chills.    Objective    Physical Exam  Vitals and nursing note reviewed.   Constitutional:       General: He is awake. He is not in acute distress.     Appearance: He is not ill-appearing.   Pulmonary:      Effort: Pulmonary effort is normal.   Genitourinary:     Comments: Rothman catheter to CD with yellow clear urine.  Right nephrostomy tube with light pink urine.  Neurological:      Mental Status: He is alert and oriented to person, place, and time.   Psychiatric:         Behavior: Behavior is cooperative.     Medications  Scheduled medications  cefTRIAXone, 1 g, intravenous, q24h  doxepin, 25 mg, oral, Nightly  iohexol, 15 mL, miscellaneous, Once in imaging  lamoTRIgine, 50 mg, oral, Daily  QUEtiapine, 200 mg, oral, Nightly      Continuous medications     PRN medications  PRN medications: acetaminophen, loperamide, morphine, morphine, ondansetron, oxygen    Labs  Results for orders placed or performed during the hospital encounter of 01/24/24 (from the past 24 hour(s))   ECG 12 lead   Result Value Ref Range    Ventricular Rate 98 BPM    Atrial Rate 98 BPM    ME Interval 156 ms    QRS Duration 94 ms    QT Interval 334 ms    QTC Calculation(Bazett) 426 ms    P Axis 61 degrees    R Axis 93 degrees    T Axis 11 degrees    QRS Count 16 beats    Q Onset 220 ms    P Onset 142 ms    P Offset 189 ms    T Offset 387 ms    QTC Fredericia 393 ms     Radiology  ECG 12 lead    Result Date: 1/25/2024  Normal sinus rhythm Rightward axis Borderline ECG When compared with ECG of 24-JAN-2024 20:49, (unconfirmed) No significant change was found    CT abdomen pelvis w IV contrast    Result Date: 1/23/2024  EXAMINATION:  CT ABDOMEN/PELVIS WITH CONTRAST, 1/23/2024 4:36 PM EST HISTORY: Right sided rib pain. COMPARISON: None. TECHNIQUE: CT scan of the abdomen and pelvis was performed with IV contrast. CT dose reduction technique was used, including Automated Exposure Control. FINDINGS: Lung bases are clear. There is a partial intrathoracic stomach. Spleen, pancreas, adrenal glands are satisfactory. There are calcified gallstones within the gallbladder lumen. There is fluid collection adjacent to the liver measuring approximately 7 x 4.3 cm which may be related to patient's shunt. Left kidney is normal. There is severe hydroureteronephrosis of the right kidney secondary to a 1.8 x 1.2 cm stone within the distal right ureter. The unopacified loops of small bowel and colon appear satisfactory. There is no free air or obstruction.    IMPRESSION: Severe right-sided hydroureteronephrosis secondary to a large 1.8 x 1.2 cm stone within the distal right ureter.     Assessment/Plan    1. 1.8 cm right distal ureteral stone with UTI  -POD #1 right nephrostomy tube with IR.  -UA positive for infection.  Await cultures from outside facility.  -Continue broad spectrum abx.  -Maintain Rothman catheter during admission, can resume ISC at home (neurogenic bladder 2/2 spina bifida).  -Definitive stone treatment after full course of abx.  -He was transferred here from outside facility and lives 90 minutes away, can establish with urologist in his area if he wishes.    Jessie Cobian, MPH, PA-C  [facility]  I'm available via [employer]

## 2024-01-25 NOTE — DISCHARGE INSTRUCTIONS
You have been started on an antibiotic called Cipro (generic: ciprofloxacin) 500mg twice a day for a complicated urinary tract infection secondary to a kidney stone.  Your NEXT dose is due in the morning on Friday, 1/26.  Your LAST dose is due in the evening on Tuesday, 1/30.  As discussed, please call your local urologist first thing in the morning to schedule an appointment.  We also recommend that you follow up with your primary care doctor within 1 week upon discharge to ensure that you are transitioning well from hospital to home.  Please go to your nearest emergency department ASAP if you notice any of the following:  Fevers - temperature >100.4F  Nausea, vomiting, or inability to keep food/liquids/medications down   CAD (coronary artery disease)

## 2024-01-25 NOTE — CARE PLAN
The patient's goals for the shift include      Problem: Pain  Goal: My pain/discomfort is manageable  Outcome: Progressing  Flowsheets (Taken 1/25/2024 1216)  Resident's pain/discomfort is manageable: Offer non-pharmacological pain management interventions     The clinical goals for the shift include pt will remain hemodynamically stable throughout shift

## 2024-01-25 NOTE — DISCHARGE SUMMARY
"Hospital Medicine Discharge Summary    Patient Name: Silver Portillo  YOB: 1985    Discharge Diagnosis: Complicated UTI in setting of large right obstructing nephrolithiasis with hydroureteronephrosis  Discharge Date: 1/25/2024  Discharge Location: Home    Hospital Course:  This is a 38-year-old male, with history of spina bifida, neurogenic bladder requiring intermittent straight cath, bipolar disorder, and prior nephrolithiasis, who initially presented to [facility] ED with right flank pain and constipation x5 days. He was found to have 1.8 x 1.2 cm stone within distal right ureter accompanied by severe hydroureteronephrosis. Patient was transferred from [facility] ED to [facility] on 1/24/24 for PCNT placement via IR. Patient was maintained on Rocephin for his complicated UTI. Urine culture thus far is growing GN bacilli. Blood cultures are negative to date. Patient will be transitioned to Cipro to complete total course of 7 days. Both the patient and his father were advised to call their local urologist to schedule a follow up appointment within the next week. They were also advised to follow up with patient's PCP in 1 week.    Time Spent: 35 minutes    Physical Exam:     /84   Pulse 90   Temp 36.2 °C (97.2 °F)   Resp 20   Ht 1.651 m (5' 5\")   Wt 58.8 kg (129 lb 9.6 oz)   SpO2 94%   BMI 21.57 kg/m²     Physical Exam:   Constitutional: well developed, awake, alert, no acute distress  ENMT: mucous membranes moist, conjunctivae clear  Head/Neck: normocephalic, atraumatic; supple, trachea midline  Respiratory/Thorax: patent airways, CTAB; no wheezes, rales, or rhonchi  Cardiovascular: RRR, no murmur appreciated  Gastrointestinal: soft, nondistended, non-tender, bowel sounds appreciated  Back: negative King's punch, negative CVA tenderness, right PCNT in place draining pink-tinged urine  Extremities: palpable peripheral pulses, no edema  Neurological: AO x3, no focal deficits  Psychological: " pleasant, cooperative  Skin: warm and dry    Discharge Medications:     Your medication list        START taking these medications        Instructions Last Dose Given Next Dose Due   ciprofloxacin 500 mg tablet  Commonly known as: Cipro      Take 1 tablet (500 mg) by mouth 2 times a day for 5 days.              CONTINUE taking these medications        Instructions Last Dose Given Next Dose Due   doxepin 25 mg capsule  Commonly known as: SINEquan           lamoTRIgine 25 mg tablet  Commonly known as: LaMICtal           QUEtiapine 200 mg tablet  Commonly known as: SEROquel           Vitamin D3 50 MCG (2000 UT) tablet  Generic drug: cholecalciferol                     Where to Get Your Medications        These medications were sent to [facility] [place] [place] - [address] [place] [address]      Phone: [phone]   ciprofloxacin 500 mg tablet          Anyi French, DO  Internal Medicine PGY3

## 2024-01-27 LAB
ATRIAL RATE: 98 BPM
P AXIS: 61 DEGREES
P OFFSET: 189 MS
P ONSET: 142 MS
PR INTERVAL: 156 MS
Q ONSET: 220 MS
QRS COUNT: 16 BEATS
QRS DURATION: 94 MS
QT INTERVAL: 334 MS
QTC CALCULATION(BAZETT): 426 MS
QTC FREDERICIA: 393 MS
R AXIS: 93 DEGREES
T AXIS: 11 DEGREES
T OFFSET: 387 MS
VENTRICULAR RATE: 98 BPM